# Patient Record
Sex: MALE | Race: ASIAN | NOT HISPANIC OR LATINO | URBAN - METROPOLITAN AREA
[De-identification: names, ages, dates, MRNs, and addresses within clinical notes are randomized per-mention and may not be internally consistent; named-entity substitution may affect disease eponyms.]

---

## 2023-06-10 ENCOUNTER — OFFICE VISIT (OUTPATIENT)
Dept: URGENT CARE | Facility: CLINIC | Age: 38
End: 2023-06-10

## 2023-06-10 VITALS
BODY MASS INDEX: 33.34 KG/M2 | SYSTOLIC BLOOD PRESSURE: 151 MMHG | HEART RATE: 97 BPM | HEIGHT: 71 IN | RESPIRATION RATE: 18 BRPM | WEIGHT: 238.13 LBS | TEMPERATURE: 99 F | OXYGEN SATURATION: 98 % | DIASTOLIC BLOOD PRESSURE: 82 MMHG

## 2023-06-10 DIAGNOSIS — S60.464A INSECT BITE OF RIGHT RING FINGER, INITIAL ENCOUNTER: Primary | ICD-10-CM
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
## 2023-06-10 PROCEDURE — 99203 PR OFFICE/OUTPT VISIT, NEW, LEVL III, 30-44 MIN: ICD-10-PCS | Mod: S$GLB,,, | Performed by: NURSE PRACTITIONER

## 2023-06-10 PROCEDURE — 99203 OFFICE O/P NEW LOW 30 MIN: CPT | Mod: S$GLB,,, | Performed by: NURSE PRACTITIONER

## 2023-06-10 NOTE — PATIENT INSTRUCTIONS
????????????????????????????????????????????????    ??????????????????????????????    ???????????????????10mg?1?1??????????????????

## 2023-06-10 NOTE — PROGRESS NOTES
"Subjective:      Patient ID: Balaji Terry is a 37 y.o. male.    Vitals:  height is 5' 10.87" (1.8 m) and weight is 108 kg (238 lb 1.6 oz). His temperature is 98.5 °F (36.9 °C). His blood pressure is 151/82 (abnormal) and his pulse is 97. His respiration is 18 and oxygen saturation is 98%.     Chief Complaint: Insect Bite    Patients states he was stung  by an insect on right hand. He states to think that the stinger was removed.    37 year old male presents to clinic with complaints of 4th finger pain and swelling on his right hand after he was stung on his 4th finger right hand approximatley 30 minuts ago. He felt something crawling on his neck and when he went to brush it off it bit his finger. He reports removing the stinger here for evaluation. Patient language is [place], [employer] language service utilized with  #[id number]    Insect Bite  This is a new problem. The current episode started today. The problem has been gradually worsening. Pertinent negatives include no abdominal pain, anorexia, arthralgias, chills, fatigue, fever, headaches, joint swelling, myalgias, numbness, rash, sore throat, swollen glands, urinary symptoms or vertigo. Nothing aggravates the symptoms. He has tried ice for the symptoms.     Constitution: Negative for chills, fatigue and fever.   HENT:  Negative for sore throat.    Gastrointestinal:  Negative for abdominal pain.   Musculoskeletal:  Negative for joint pain, joint swelling and muscle ache.   Skin:  Positive for color change and erythema. Negative for rash.   Neurological:  Negative for history of vertigo, headaches, numbness and tingling.    Objective:     Physical Exam   Constitutional: He is oriented to person, place, and time. He appears well-developed.   HENT:   Head: Normocephalic and atraumatic. Head is without abrasion, without contusion and without laceration.   Ears:   Right Ear: External ear normal.   Left Ear: External ear normal.   Nose: Nose normal. "   Mouth/Throat: Oropharynx is clear and moist and mucous membranes are normal.   Eyes: EOM and lids are normal. Extraocular movement intact   Neck: Trachea normal and phonation normal. Neck supple.   Cardiovascular: Normal rate, regular rhythm and normal heart sounds.   Pulmonary/Chest: Effort normal and breath sounds normal. No stridor. No respiratory distress.   Musculoskeletal: Normal range of motion.         General: Normal range of motion.      Right hand: Right ring finger: Exhibits swelling and tenderness (small black foreign body noted to finger, area cleaned with alcohol, sterile needle utilized, small black stinger removed without difficulty).   Neurological: He is alert and oriented to person, place, and time.   Skin: Skin is warm, dry, intact and no rash. Capillary refill takes less than 2 seconds. erythema No abrasion, No burn, No bruising and No ecchymosis   Psychiatric: His speech is normal and behavior is normal. Judgment and thought content normal.   Nursing note and vitals reviewed.    Assessment:     1. Insect bite of right ring finger, initial encounter        Plan:       Insect bite of right ring finger, initial encounter      Patient Instructions   ????????????????????????????????????????????????    ??????????????????????????????    ???????????????????10mg?1?1??????????????????